# Patient Record
Sex: MALE | Race: OTHER | HISPANIC OR LATINO
[De-identification: names, ages, dates, MRNs, and addresses within clinical notes are randomized per-mention and may not be internally consistent; named-entity substitution may affect disease eponyms.]

---

## 2022-01-12 ENCOUNTER — LABORATORY RESULT (OUTPATIENT)
Age: 67
End: 2022-01-12

## 2022-01-12 ENCOUNTER — NON-APPOINTMENT (OUTPATIENT)
Age: 67
End: 2022-01-12

## 2022-01-12 ENCOUNTER — APPOINTMENT (OUTPATIENT)
Dept: CARDIOLOGY | Facility: HOSPITAL | Age: 67
End: 2022-01-12

## 2022-01-12 VITALS
TEMPERATURE: 98.2 F | DIASTOLIC BLOOD PRESSURE: 78 MMHG | BODY MASS INDEX: 30.78 KG/M2 | OXYGEN SATURATION: 97 % | SYSTOLIC BLOOD PRESSURE: 137 MMHG | HEART RATE: 74 BPM | HEIGHT: 61 IN | WEIGHT: 163 LBS | RESPIRATION RATE: 16 BRPM

## 2022-01-12 DIAGNOSIS — Z00.00 ENCOUNTER FOR GENERAL ADULT MEDICAL EXAMINATION W/OUT ABNORMAL FINDINGS: ICD-10-CM

## 2022-01-12 DIAGNOSIS — R06.00 DYSPNEA, UNSPECIFIED: ICD-10-CM

## 2022-01-12 DIAGNOSIS — Z78.9 OTHER SPECIFIED HEALTH STATUS: ICD-10-CM

## 2022-01-12 DIAGNOSIS — Z80.42 FAMILY HISTORY OF MALIGNANT NEOPLASM OF PROSTATE: ICD-10-CM

## 2022-01-12 DIAGNOSIS — Z86.39 PERSONAL HISTORY OF OTHER ENDOCRINE, NUTRITIONAL AND METABOLIC DISEASE: ICD-10-CM

## 2022-01-12 DIAGNOSIS — R07.9 CHEST PAIN, UNSPECIFIED: ICD-10-CM

## 2022-01-13 NOTE — REVIEW OF SYSTEMS
[Fever] : no fever [Headache] : no headache [Chills] : no chills [Feeling Fatigued] : not feeling fatigued [Blurry Vision] : no blurred vision [Sore Throat] : no sore throat [Sinus Pressure] : no sinus pressure [SOB] : no shortness of breath [Dyspnea on exertion] : not dyspnea during exertion [Chest Discomfort] : no chest discomfort [Leg Claudication] : no intermittent leg claudication [Palpitations] : no palpitations [Cough] : no cough [Wheezing] : no wheezing [Abdominal Pain] : no abdominal pain [Nausea] : no nausea [Vomiting] : no vomiting [Change in Appetite] : no change in appetite [Urinary Frequency] : no change in urinary frequency [Hematuria] : no hematuria [Erectile Dysfunction] : no erectile dysfunction [Joint Swelling] : no joint swelling [Myalgia] : no myalgia [Dizziness] : no dizziness [Numbness (Hypoesthesia)] : no numbness [Convulsions] : no convulsions [Tingling (Paresthesia)] : no tingling [Weakness] : no weakness [Confusion] : no confusion was observed [Suicidal] : not suicidal [Easy Bleeding] : no tendency for easy bleeding [Swollen Glands] : no swollen glands [FreeTextEntry5] : see hpi

## 2022-01-13 NOTE — DISCUSSION/SUMMARY
[Patient] : the patient [With Me] : with me [___ Day(s)] : in [unfilled] day(s) [FreeTextEntry1] : Mr Pack is a 67 year old male with a history of HTN and DMII (taking oral medications) who has been having episodes of chest pain.  He discussed it with pcp who recommended trial of ppi however patient refused and given concern it is cardiac in nature came to see me.  He has 2-3 episodes a week of chest pain.  They will occur at random, can go for 20m - 1 hour.  He does not feel it is related to exercise.  He is able to walk up 4 flights of stairs with no issues.  At times associated with palpitations.  This pain has been bothering him for years but it is getting worse.\par \par He had COVID 12/20/21 - now asymptomatic and recovered\par \par Chest pain and dyspnea\par - has abnormal ecg, multiple risk factors for ischemia however history not typical\par - will get exercise nuclear stress test\par - will get echo\par - check cbc, rule out anemia\par - will check lipid panel\par \par Ectopy\par - patient with frequent PVCs, trigemeny on ekg\par - will pursue ischemic and structural work up as above\par - if work up negative will get MCOT study\par \par HTN\par - he does not recall his medications, important to bring with him on next visit\par - will get labs: TSH, CMP\par \par DMII\par - will check A1C\par - ask to bring meds on further visit\par

## 2022-01-13 NOTE — REASON FOR VISIT
[Symptom and Test Evaluation] : symptom and test evaluation [FreeTextEntry1] : Mr Pack is a 67 year old male with a history of HTN and DMII (taking oral medications) who has been having episodes of chest pain.  He discussed it with pcp who recommended trial of ppi however patient refused and given concern it is cardiac in nature came to see me.  He has 2-3 episodes a week of chest pain.  They will occur at random, can go for 20m - 1 hour.  He does not feel it is related to exercise.  He is able to walk up 4 flights of stairs with no issues.  At times associated with palpitations.  This pain has been bothering him for years but it is getting worse.\par \par He had COVID 12/20/21 - now asymptomatic and recovered\par \par PMH - DM, HTN, cannot recall his meds\par \par PSH - denies surgeries\par \par FHx\par Denies family history of cardiac issues\par \par SHx\par From [place]\par Denies smoking\par Rare alcohol use, 1-2 x per year\par \par \par PCP - John Venegas ([place])

## 2022-01-26 ENCOUNTER — APPOINTMENT (OUTPATIENT)
Dept: CV DIAGNOSITCS | Facility: HOSPITAL | Age: 67
End: 2022-01-26
Payer: COMMERCIAL

## 2022-01-26 ENCOUNTER — OUTPATIENT (OUTPATIENT)
Dept: OUTPATIENT SERVICES | Facility: HOSPITAL | Age: 67
LOS: 1 days | End: 2022-01-26

## 2022-01-26 ENCOUNTER — APPOINTMENT (OUTPATIENT)
Dept: CV DIAGNOSTICS | Facility: HOSPITAL | Age: 67
End: 2022-01-26
Payer: COMMERCIAL

## 2022-01-26 DIAGNOSIS — I10 ESSENTIAL (PRIMARY) HYPERTENSION: ICD-10-CM

## 2022-01-26 DIAGNOSIS — R06.00 DYSPNEA, UNSPECIFIED: ICD-10-CM

## 2022-01-26 DIAGNOSIS — R07.9 CHEST PAIN, UNSPECIFIED: ICD-10-CM

## 2022-01-26 PROCEDURE — 78452 HT MUSCLE IMAGE SPECT MULT: CPT | Mod: 26,MH

## 2022-01-26 PROCEDURE — 93018 CV STRESS TEST I&R ONLY: CPT | Mod: GC

## 2022-01-26 PROCEDURE — 93306 TTE W/DOPPLER COMPLETE: CPT | Mod: 26

## 2022-01-26 PROCEDURE — 93016 CV STRESS TEST SUPVJ ONLY: CPT | Mod: GC

## 2022-02-16 ENCOUNTER — NON-APPOINTMENT (OUTPATIENT)
Age: 67
End: 2022-02-16

## 2022-02-16 ENCOUNTER — APPOINTMENT (OUTPATIENT)
Dept: CARDIOLOGY | Facility: HOSPITAL | Age: 67
End: 2022-02-16

## 2022-02-16 VITALS
HEIGHT: 61 IN | RESPIRATION RATE: 16 BRPM | OXYGEN SATURATION: 99 % | DIASTOLIC BLOOD PRESSURE: 90 MMHG | HEART RATE: 40 BPM | BODY MASS INDEX: 30.96 KG/M2 | WEIGHT: 164 LBS | SYSTOLIC BLOOD PRESSURE: 194 MMHG

## 2022-02-16 VITALS — SYSTOLIC BLOOD PRESSURE: 158 MMHG | DIASTOLIC BLOOD PRESSURE: 80 MMHG

## 2022-02-16 DIAGNOSIS — I49.3 VENTRICULAR PREMATURE DEPOLARIZATION: ICD-10-CM

## 2022-02-16 RX ORDER — METFORMIN HYDROCHLORIDE 500 MG/1
500 TABLET, COATED ORAL TWICE DAILY
Refills: 0 | Status: ACTIVE | COMMUNITY

## 2022-02-16 RX ORDER — LOSARTAN POTASSIUM 25 MG/1
25 TABLET, FILM COATED ORAL DAILY
Qty: 90 | Refills: 3 | Status: ACTIVE | COMMUNITY
Start: 2022-02-16 | End: 1900-01-01

## 2022-02-17 DIAGNOSIS — E11.9 TYPE 2 DIABETES MELLITUS W/OUT COMPLICATIONS: ICD-10-CM

## 2022-02-17 DIAGNOSIS — I10 ESSENTIAL (PRIMARY) HYPERTENSION: ICD-10-CM

## 2022-02-17 DIAGNOSIS — M94.0 CHONDROCOSTAL JUNCTION SYNDROME [TIETZE]: ICD-10-CM

## 2022-02-17 DIAGNOSIS — E78.1 PURE HYPERGLYCERIDEMIA: ICD-10-CM

## 2022-02-22 NOTE — DISCUSSION/SUMMARY
[Patient] : the patient [With Me] : with me [___ Month(s)] : in [unfilled] month(s) [FreeTextEntry1] : Mr Pack is a 67 year old male with a history of HTN and DMII (taking oral medications) who has been having episodes of chest pain.   He had a negative stress test and normal Echo.  His chest pain is worse with palpation along the \par He had COVID 12/20/21 - now asymptomatic and recovered\par \par Chest pain consistent with costochondritis\par - cardiac work up currently negative, no ischemic or structural disease\par - blood pressure equal on both sides, no suspicion for dissection\par - likely costochondritis\par \par Hypertriglyceridemia\par - repeat lipid panel with direct ldl\par - will start on Vascepa 2 g bid given triglycerade level > 300\par \par Ectopy\par - patient with frequent PVCs, trigemeny on ekg\par - plan for EP referral\par - start on metoprolol\par \par HTN\par - not on BP meds not well controlled\par - will start on losartan and metop\par \par DMII\par - follows with pcp, on metformin\par

## 2022-02-22 NOTE — REASON FOR VISIT
[Symptom and Test Evaluation] : symptom and test evaluation [FreeTextEntry1] : Update:\par 2/16/22  \par He has completed a NM stress test and TTE that showed a normally functioning heart with no perfusion abnormalities.  He continues to have intermittent chest pain.  Worse when he lays on his chest improves over the day.  Again not associated with exertion and worse with palpation especially at spot where sternum meets his ribs.\par \par +++++++++++++++++++++++\par Mr Pack is a 67 year old male with a history of HTN and DMII (taking oral medications) who has been having episodes of chest pain.  He discussed it with pcp who recommended trial of ppi however patient refused and given concern it is cardiac in nature came to see me.  He has 2-3 episodes a week of chest pain.  They will occur at random, can go for 20m - 1 hour.  He does not feel it is related to exercise.  He is able to walk up 4 flights of stairs with no issues.  At times associated with palpitations.  This pain has been bothering him for years but it is getting worse.\par \par He had COVID 12/20/21 - now asymptomatic and recovered\par \par PMH - DM, HTN, cannot recall his meds\par \par PSH - denies surgeries\par \par FHx\par Denies family history of cardiac issues\par \par SHx\par From [place]\par Denies smoking\par Rare alcohol use, 1-2 x per year\par \par \par PCP - John Venegas ([place])

## 2022-02-22 NOTE — END OF VISIT
[FreeTextEntry3] : Mr Pack is a 67 year old man with history of HTN and DMII (on oral hypoglycemic) p/w episodes of chest pain.  Nuclear stress showed no evidence of infarction or inducible ischemia. LV wall motion was normal with LVEF 56%. There were no significant abnormalities on TTE.  He had COVID 12/20/21 - now asymptomatic and recovered. Chest pain may be due to costochondritis. \par

## 2022-02-23 ENCOUNTER — APPOINTMENT (OUTPATIENT)
Dept: CARDIOLOGY | Facility: HOSPITAL | Age: 67
End: 2022-02-23

## 2022-02-28 ENCOUNTER — NON-APPOINTMENT (OUTPATIENT)
Age: 67
End: 2022-02-28

## 2022-02-28 ENCOUNTER — APPOINTMENT (OUTPATIENT)
Dept: ELECTROPHYSIOLOGY | Facility: CLINIC | Age: 67
End: 2022-02-28

## 2022-02-28 VITALS — WEIGHT: 168 LBS | BODY MASS INDEX: 31.74 KG/M2

## 2022-02-28 VITALS
DIASTOLIC BLOOD PRESSURE: 82 MMHG | HEIGHT: 61 IN | OXYGEN SATURATION: 97 % | BODY MASS INDEX: 31.74 KG/M2 | HEART RATE: 68 BPM | SYSTOLIC BLOOD PRESSURE: 153 MMHG

## 2022-04-13 ENCOUNTER — APPOINTMENT (OUTPATIENT)
Dept: CARDIOLOGY | Facility: HOSPITAL | Age: 67
End: 2022-04-13

## 2022-05-04 ENCOUNTER — APPOINTMENT (OUTPATIENT)
Dept: CARDIOLOGY | Facility: HOSPITAL | Age: 67
End: 2022-05-04

## 2023-08-08 ENCOUNTER — NON-APPOINTMENT (OUTPATIENT)
Age: 68
End: 2023-08-08

## 2023-08-08 ENCOUNTER — APPOINTMENT (OUTPATIENT)
Dept: CARDIOLOGY | Facility: HOSPITAL | Age: 68
End: 2023-08-08

## 2023-08-08 VITALS
SYSTOLIC BLOOD PRESSURE: 163 MMHG | WEIGHT: 168 LBS | BODY MASS INDEX: 31.72 KG/M2 | TEMPERATURE: 98.2 F | HEART RATE: 59 BPM | HEIGHT: 61 IN | DIASTOLIC BLOOD PRESSURE: 74 MMHG | OXYGEN SATURATION: 96 %

## 2023-08-08 DIAGNOSIS — R07.89 OTHER CHEST PAIN: ICD-10-CM

## 2023-08-08 DIAGNOSIS — I10 ESSENTIAL (PRIMARY) HYPERTENSION: ICD-10-CM

## 2023-08-08 DIAGNOSIS — E78.5 HYPERLIPIDEMIA, UNSPECIFIED: ICD-10-CM

## 2023-08-08 DIAGNOSIS — E11.9 TYPE 2 DIABETES MELLITUS W/OUT COMPLICATIONS: ICD-10-CM

## 2023-08-08 RX ORDER — ICOSAPENT ETHYL 1 G/1
1 CAPSULE ORAL
Qty: 120 | Refills: 3 | Status: DISCONTINUED | COMMUNITY
Start: 2022-02-16 | End: 2023-08-08

## 2023-08-08 RX ORDER — LOSARTAN POTASSIUM 50 MG/1
50 TABLET, FILM COATED ORAL DAILY
Qty: 30 | Refills: 3 | Status: ACTIVE | COMMUNITY
Start: 2023-08-08 | End: 1900-01-01

## 2023-08-08 RX ORDER — TAMSULOSIN HYDROCHLORIDE 0.4 MG/1
0.4 CAPSULE ORAL DAILY
Refills: 0 | Status: DISCONTINUED | COMMUNITY
End: 2023-08-08

## 2023-08-08 RX ORDER — ATORVASTATIN CALCIUM 40 MG/1
40 TABLET, FILM COATED ORAL
Qty: 30 | Refills: 3 | Status: ACTIVE | COMMUNITY
Start: 2023-08-08 | End: 1900-01-01

## 2023-08-08 RX ORDER — METOPROLOL TARTRATE 25 MG/1
25 TABLET, FILM COATED ORAL TWICE DAILY
Qty: 60 | Refills: 5 | Status: DISCONTINUED | COMMUNITY
Start: 2022-02-16 | End: 2023-08-08

## 2023-08-08 NOTE — CARDIOLOGY SUMMARY
[de-identified] : 1/26/22 NUCLEAR FINDINGS: The left ventricle was normal in size. Normal myocardial perfusion scan,with no evidence of infarction or inducible ischemia. ------------------------------------------------------------------------ GATED ANALYSIS: Post-stress gated wall motion analysis was performed (LVEF = 56 %;LVEDV = 128 ml.) ------------------------------------------------------------------------ IMPRESSIONS:Normal Study * The left ventricle was normal in size. * Tracer uptake was homogeneous throughout the left ventricle. * Normal study; no evidence for myocardial infarction or ischemia. * Gated wall motion analysis was performed, and shows normal wall motion. [de-identified] : 1/26/22 Mitral Valve: Mitral annular calcification, otherwise normal mitral valve. Minimal mitral regurgitation. Aortic Root: Aortic Root: 3.4 cm. Ascending Aorta: 3.7 cm. Aortic Valve: Normal trileaflet aortic valve. Left Atrium: Normal left atrium.  LA volume index = 34 cc/m2. Left Ventricle: Normal left ventricular systolic function. No segmental wall motion abnormalities. Normal left ventricular internal dimensions and wall thicknesses. Right Heart: Normal right atrium. Normal right ventricular size and function. Normal tricuspid valve. Minimal tricuspid regurgitation. Normal pulmonic valve. Minimal pulmonic regurgitation. Pericardium/PleuraNormal pericardium with no pericardial effusion. Hemodynamic: Estimated right ventricular systolic pressure equals 33 mm Hg, assuming right atrial pressure equals

## 2023-08-08 NOTE — HISTORY OF PRESENT ILLNESS
[FreeTextEntry1] : 68M with a pmhx of HTN, hypertriglyceridemia, and T2DM presenting for follow up. Last seen in Feb 2022- at that time was evaluated for chest pain. Had a normal TTE and nuclear stress test- lost to follow up due to insurance issues. Also recommended to start metoprolol and losartan for HTN, which was not started by the patient due to insurance issues. Patient obtained insurance and established care with PCP, now presenting for cardiac follow up.  since last seen in the office, the patient continues to have intermittent left sided chest pain. described as a pressure like sensation, lasts for about 1-2 minutes at a time, sporadic, not related to exertion or relieved with rest, no identifiable alleviating or aggravating factors. has a family history of cardiac disease and likely MI in his father (passed away from cardiac event in his 70s) and has multiple risk factors including HTN, hypertriglyceridemia, and T2DM. I called the patient's PCP office (John Venegas, [phone]) with the following med rec: ASA 81 qd, Atorvastatin 10mg qd, Metformin 500mg BID, Losartan 25mg qd. the patient states that he started taking these meds about 3-4 weeks ago but wasn't entirely sure.

## 2023-08-08 NOTE — CARDIOLOGY SUMMARY
[de-identified] : 1/26/22 NUCLEAR FINDINGS: The left ventricle was normal in size. Normal myocardial perfusion scan,with no evidence of infarction or inducible ischemia. ------------------------------------------------------------------------ GATED ANALYSIS: Post-stress gated wall motion analysis was performed (LVEF = 56 %;LVEDV = 128 ml.) ------------------------------------------------------------------------ IMPRESSIONS:Normal Study * The left ventricle was normal in size. * Tracer uptake was homogeneous throughout the left ventricle. * Normal study; no evidence for myocardial infarction or ischemia. * Gated wall motion analysis was performed, and shows normal wall motion. [de-identified] : 1/26/22 Mitral Valve: Mitral annular calcification, otherwise normal mitral valve. Minimal mitral regurgitation. Aortic Root: Aortic Root: 3.4 cm. Ascending Aorta: 3.7 cm. Aortic Valve: Normal trileaflet aortic valve. Left Atrium: Normal left atrium.  LA volume index = 34 cc/m2. Left Ventricle: Normal left ventricular systolic function. No segmental wall motion abnormalities. Normal left ventricular internal dimensions and wall thicknesses. Right Heart: Normal right atrium. Normal right ventricular size and function. Normal tricuspid valve. Minimal tricuspid regurgitation. Normal pulmonic valve. Minimal pulmonic regurgitation. Pericardium/PleuraNormal pericardium with no pericardial effusion. Hemodynamic: Estimated right ventricular systolic pressure equals 33 mm Hg, assuming right atrial pressure equals

## 2023-08-08 NOTE — ASSESSMENT
[FreeTextEntry1] : 68M with a pmhx of HTN, hypertriglyceridemia, and T2DM presenting for follow up of chest pain.  #Chest pain Although atypical in nature, patient at a high risk of cardiovascular events given metabolic co-morbidities, and family history of cardiac events. - will obtain CT coronary angiography given normal stress test in 2022 - continue ASA, increase atorvastatin to 40mg qd  #HTN - increase losartan to 50mg qd  #Hypertriglyceridemia - increase atorvastatin to 40mg qd   #Type 2 diabetes - continue MFM 500mg BID

## 2023-08-08 NOTE — HISTORY OF PRESENT ILLNESS
[FreeTextEntry1] : 68M with a pmhx of HTN, hypertriglyceridemia, and T2DM presenting for follow up. Last seen in Feb 2022- at that time was evaluated for chest pain. Had a normal TTE and nuclear stress test- lost to follow up due to insurance issues. Also recommended to start metoprolol and losartan for HTN, which was not started by the patient due to insurance issues. Patient obtained insurance and established care with PCP, now presenting for cardiac follow up.  since last seen in the office, the patient continues to have intermittent left sided chest pain. described as a pressure like sensation, lasts for about 1-2 minutes at a time, sporadic, not related to exertion or relieved with rest, no identifiable alleviating or aggravating factors. has a family history of cardiac disease and likely MI in his father (passed away from cardiac event in his 70s) and has multiple risk factors including HTN, hypertriglyceridemia, and T2DM. I called the patient's PCP office (John Venegas, [phone]) with the following med rec: ASA 81 qd, Atorvastatin 10mg qd, Metformin 500mg BID, Losartan 25mg qd. the patient states that he started taking these meds about 3-4 weeks ago but wasn't entirely sure.